# Patient Record
Sex: MALE | Race: OTHER | HISPANIC OR LATINO | Employment: FULL TIME | URBAN - METROPOLITAN AREA
[De-identification: names, ages, dates, MRNs, and addresses within clinical notes are randomized per-mention and may not be internally consistent; named-entity substitution may affect disease eponyms.]

---

## 2017-08-18 ENCOUNTER — HOSPITAL ENCOUNTER (EMERGENCY)
Facility: HOSPITAL | Age: 27
Discharge: HOME | End: 2017-08-18
Attending: EMERGENCY MEDICINE | Admitting: EMERGENCY MEDICINE

## 2017-08-18 VITALS
OXYGEN SATURATION: 98 % | HEIGHT: 66 IN | WEIGHT: 203 LBS | RESPIRATION RATE: 14 BRPM | TEMPERATURE: 98.6 F | HEART RATE: 71 BPM | DIASTOLIC BLOOD PRESSURE: 75 MMHG | BODY MASS INDEX: 32.62 KG/M2 | SYSTOLIC BLOOD PRESSURE: 132 MMHG

## 2017-08-18 DIAGNOSIS — K59.00 CONSTIPATION: Primary | ICD-10-CM

## 2017-08-18 PROCEDURE — 99283 EMERGENCY DEPT VISIT LOW MDM: CPT

## 2017-08-18 RX ORDER — DOCUSATE SODIUM 100 MG/1
100 CAPSULE, LIQUID FILLED ORAL 2 TIMES DAILY
Qty: 28 CAPSULE | Refills: 0 | Status: SHIPPED | OUTPATIENT
Start: 2017-08-18 | End: 2018-09-28

## 2017-08-18 RX ORDER — POLYETHYLENE GLYCOL 3350 17 G/17G
17 POWDER, FOR SOLUTION ORAL DAILY PRN
Qty: 14 EACH | Refills: 0 | Status: SHIPPED | OUTPATIENT
Start: 2017-08-18 | End: 2018-09-28

## 2018-09-28 ENCOUNTER — HOSPITAL ENCOUNTER (EMERGENCY)
Facility: HOSPITAL | Age: 28
Discharge: HOME | End: 2018-09-28
Attending: EMERGENCY MEDICINE | Admitting: EMERGENCY MEDICINE

## 2018-09-28 VITALS
RESPIRATION RATE: 18 BRPM | BODY MASS INDEX: 29.05 KG/M2 | TEMPERATURE: 97.7 F | WEIGHT: 180 LBS | HEART RATE: 81 BPM | DIASTOLIC BLOOD PRESSURE: 94 MMHG | SYSTOLIC BLOOD PRESSURE: 151 MMHG | OXYGEN SATURATION: 97 %

## 2018-09-28 DIAGNOSIS — K62.5 RECTAL BLEEDING: ICD-10-CM

## 2018-09-28 DIAGNOSIS — K62.4 ANAL STENOSIS: ICD-10-CM

## 2018-09-28 DIAGNOSIS — K59.00 CONSTIPATION: ICD-10-CM

## 2018-09-28 DIAGNOSIS — K62.89 ANAL OR RECTAL PAIN: Primary | ICD-10-CM

## 2018-09-28 PROCEDURE — 82272 OCCULT BLD FECES 1-3 TESTS: CPT

## 2018-09-28 PROCEDURE — 99283 EMERGENCY DEPT VISIT LOW MDM: CPT

## 2018-09-28 RX ORDER — DIAPER,BRIEF,INFANT-TODD,DISP
EACH MISCELLANEOUS
Qty: 15 G | Refills: 0 | Status: SHIPPED | OUTPATIENT
Start: 2018-09-28 | End: 2019-02-21

## 2018-09-29 NOTE — DISCHARGE INSTRUCTIONS
Constipation   WHAT YOU NEED TO KNOW:   Constipation is when you have hard, dry bowel movements, or you go longer than usual between bowel movements  DISCHARGE INSTRUCTIONS:   Return to the emergency department if:   · You have blood in your bowel movements  · You have a fever and abdominal pain with the constipation  Contact your healthcare provider if:   · Your constipation gets worse  · You start to vomit  · You have questions or concerns about your condition or care  Medicines:   · Medicine or a fiber supplement  may help make your bowel movement softer  A laxative may help relax and loosen your intestines to help you have a bowel movement  You may also be given medicine to increase fluid in your intestines  The fluid may help move bowel movements through your intestines  · Take your medicine as directed  Contact your healthcare provider if you think your medicine is not helping or if you have side effects  Tell him of her if you are allergic to any medicine  Keep a list of the medicines, vitamins, and herbs you take  Include the amounts, and when and why you take them  Bring the list or the pill bottles to follow-up visits  Carry your medicine list with you in case of an emergency  Manage your constipation:   · Drink liquids as directed  You may need to drink extra liquids to help soften and move your bowels  Ask how much liquid to drink each day and which liquids are best for you  · Eat high-fiber foods  This may help decrease constipation by adding bulk to your bowel movements  High-fiber foods include fruit, vegetables, whole-grain breads and cereals, and beans  Your healthcare provider or dietitian can help you create a high-fiber meal plan  · Exercise regularly  Regular physical activity can help stimulate your intestines  Ask which exercises are best for you  · Schedule a time each day to have a bowel movement    This may help train your body to have regular bowel movements  Bend forward while you are on the toilet to help move the bowel movement out  Sit on the toilet for at least 10 minutes, even if you do not have a bowel movement  Follow up with your healthcare provider as directed:  Write down your questions so you remember to ask them during your visits  © 2017 [employer] Information is for End User's use only and may not be sold, redistributed or otherwise used for commercial purposes  All illustrations and images included in CareNotes® are the copyrighted property of A D A M , [employer]  or Dhiraj Lea  The above information is an  only  It is not intended as medical advice for individual conditions or treatments  Talk to your doctor, nurse or pharmacist before following any medical regimen to see if it is safe and effective for you  Rectal Pain   WHAT YOU NEED TO KNOW:   Rectal pain can be caused by a number of conditions, such as hemorrhoids, an abscess, trauma, or anal tear  Infection, muscle spasms, or anal intercourse can also cause rectal pain  DISCHARGE INSTRUCTIONS:   Medicines: You may need any of the following:  · NSAIDs , such as ibuprofen, help decrease swelling, pain, and fever  This medicine is available with or without a doctor's order  NSAIDs can cause stomach bleeding or kidney problems in certain people  If you take blood thinner medicine, always ask your healthcare provider if NSAIDs are safe for you  Always read the medicine label and follow directions  · Prescription pain medicine  may be given  Ask how to take this medicine safely  · Antibiotics  help treat or prevent a bacterial infection  · Bowel movement softeners  help soften your bowel movement  They help prevent straining and more damage to the area  · Take your medicine as directed  Contact your healthcare provider if you think your medicine is not helping or if you have side effects   Tell him or her if you are allergic to any medicine  Keep a list of the medicines, vitamins, and herbs you take  Include the amounts, and when and why you take them  Bring the list or the pill bottles to follow-up visits  Carry your medicine list with you in case of an emergency  Return to the emergency department if:   · You have severe pain  Contact your healthcare provider if:   · Your pain does not decrease after 1 to 2 days of treatment  · You cannot take the medicine prescribed for your condition  · You have questions or concerns about your condition or care  Take a sitz bath:  Fill a bathtub with 4 to 6 inches of warm water  You may also use a sitz bath pan that fits over a toilet  Sit in the sitz bath for 20 minutes  Do this 2 to 3 times a day, or as directed  The warm water can help decrease pain, muscle spasms, or swelling  Apply heat:  Apply a warm, moist compress on your anus for 20 to 30 minutes every 2 hours for as many days as directed  Heat helps decrease pain and muscle spasms  Eat high-fiber foods: This will help prevent constipation and soften your bowel movements  High-fiber foods include fruit, vegetables, whole-grain breads and cereals, and beans  A dietitian or healthcare provider can help you create a high-fiber meal plan  Drink liquids as directed: You may need to drink more liquid than usual to help soften your bowel movements  Ask how much liquid to drink each day and which liquids are best for you  Follow up with your healthcare provider as directed:  Write down your questions so you remember to ask them during your visits  © 2017 [employer]  Information is for End User's use only and may not be sold, redistributed or otherwise used for commercial purposes  All illustrations and images included in CareNotes® are the copyrighted property of [employer]  or Dhiraj Lea  The above information is an  only   It is not intended as medical advice for individual conditions or treatments  Talk to your doctor, nurse or pharmacist before following any medical regimen to see if it is safe and effective for you        Bowel Regiment  MiraLAX: 3 times a day for 3 days, then once daily  Colace: 1 pill twice a day  Citrucel: As directed on the bottle  You should drink at least 1 5 L of water per day    If you have been taking narcotic pain medication you should add:  Senokot: 2 tablets at bedtime

## 2018-09-29 NOTE — ED PROVIDER NOTES
History  Chief Complaint   Patient presents with    Rectal Pain     C/O rectal pain and burning x3 days states noted bright red blood in stool yesterday and today     17-year-old male presents for evaluation of constipation with associated rectal pain  The patient states that he has had constipation for several years and over the past couple of weeks has had worsening problems with constipation and associated rectal pain with defecation  Patient has been trying Colace and senna without improvement  He states that he has to strain to move his bowels and over the past couple days has had blood in the bowel movements and on the toilet paper  Patient denies any associated abdominal pain  The patient states that he used to take Percocet several years ago but stopped them due to constipation  He has also been taking nonprescribed phentermine and drinking copious amounts of alcohol  The patient denies any rectal foreign bodies or rectal intercourse            None       History reviewed  No pertinent past medical history  Past Surgical History:   Procedure Laterality Date    FRACTURE SURGERY         History reviewed  No pertinent family history  I have reviewed and agree with the history as documented  Social History   Substance Use Topics    Smoking status: Current Every Day Smoker     Packs/day: 0 25     Types: Cigarettes    Smokeless tobacco: Never Used    Alcohol use Yes        Review of Systems   Constitutional: Negative for chills and fever  Gastrointestinal: Positive for anal bleeding, blood in stool, constipation and rectal pain  Negative for diarrhea, nausea and vomiting  All other systems reviewed and are negative  Physical Exam  Physical Exam   Constitutional: He is oriented to person, place, and time  He appears well-developed and well-nourished  No distress  HENT:   Head: Normocephalic and atraumatic     Right Ear: External ear normal    Left Ear: External ear normal  Mouth/Throat: No oropharyngeal exudate  Eyes: Pupils are equal, round, and reactive to light  EOM are normal  No scleral icterus  Neck: Normal range of motion  Neck supple  Cardiovascular: Normal rate, regular rhythm and normal heart sounds  Pulmonary/Chest: Effort normal and breath sounds normal  No respiratory distress  Abdominal: Soft  Bowel sounds are normal  There is no tenderness  There is no rebound and no guarding  Genitourinary: Rectal exam shows fissure, tenderness and guaiac positive stool  Rectal exam shows no external hemorrhoid and no mass  Musculoskeletal: Normal range of motion  Neurological: He is alert and oriented to person, place, and time  Skin: Skin is warm and dry  No rash noted  Psychiatric: He has a normal mood and affect  Nursing note and vitals reviewed  Vital Signs  ED Triage Vitals [09/28/18 1847]   Temperature Pulse Respirations Blood Pressure SpO2   97 7 °F (36 5 °C) 81 18 151/94 97 %      Temp Source Heart Rate Source Patient Position - Orthostatic VS BP Location FiO2 (%)   Oral Monitor Sitting Right arm --      Pain Score       8           Vitals:    09/28/18 1847   BP: 151/94   Pulse: 81   Patient Position - Orthostatic VS: Sitting       Visual Acuity      ED Medications  Medications - No data to display    Diagnostic Studies  Results Reviewed     None                 No orders to display              Procedures  Procedures       Phone Contacts  ED Phone Contact    ED Course                               MDM  Number of Diagnoses or Management Options  Anal or rectal pain: new and requires workup  Anal stenosis: new and requires workup  Constipation: new and requires workup  Rectal bleeding: new and requires workup  Diagnosis management comments: I discussed the patient's pain on exam of the anus with bleeding  There are no signs of infection  The patient does not report a history of fevers chills or other systemic infection    Patient states the symptoms have been ongoing for quite some time  I discussed the absolute need to follow up with GI for further evaluation of possible anal stenosis, anal mass or other serious medical conditions such as anal carcinoma  The patient verbalized understanding of the need to follow up with GI  He also verbalized understanding of warnings that would necessitate return to the emergency department  We also discussed a bowel regimen to soften the stool to help facilitate passage of stool       Amount and/or Complexity of Data Reviewed  Review and summarize past medical records: yes      CritCare Time    Disposition  Final diagnoses:   Anal or rectal pain   Constipation   Rectal bleeding   Anal stenosis - possible     Time reflects when diagnosis was documented in both MDM as applicable and the Disposition within this note     Time User Action Codes Description Comment    9/28/2018  8:06 PM Dianah Pellant Add [K62 89] Anal or rectal pain     9/28/2018  8:07 PM Dianah Pellant Add [K59 00] Constipation     9/28/2018  8:07 PM Dianah Pellant Add [K62 5] Rectal bleeding     9/28/2018  8:07 PM Reather Economy B Add [K62 4] Anal stenosis     9/28/2018  8:07 PM Dianah Pellant Modify [K62 4] Anal stenosis possible      ED Disposition     ED Disposition Condition Comment    Discharge  Mer Moraes discharge to home/self care      Condition at discharge: Stable        Follow-up Information     Follow up With Specialties Details Why [facility] Schedule an appointment as soon as possible for a visit For further evaluation [address] [place] [address]  [id number]  [address] [facility] Schedule an appointment as soon as possible for a visit For further evaluation of anal pain and bleeding [facility] [id number] [phone]          Patient's Medications Discharge Prescriptions    No medications on file     No discharge procedures on file      ED Provider  Electronically Signed by           Brian Sierra DO  09/28/18 6470

## 2019-02-21 ENCOUNTER — HOSPITAL ENCOUNTER (EMERGENCY)
Facility: HOSPITAL | Age: 29
Discharge: HOME | End: 2019-02-21
Attending: EMERGENCY MEDICINE | Admitting: EMERGENCY MEDICINE

## 2019-02-21 VITALS
HEIGHT: 66 IN | HEART RATE: 62 BPM | TEMPERATURE: 96.5 F | RESPIRATION RATE: 16 BRPM | DIASTOLIC BLOOD PRESSURE: 84 MMHG | WEIGHT: 180 LBS | OXYGEN SATURATION: 99 % | SYSTOLIC BLOOD PRESSURE: 144 MMHG | BODY MASS INDEX: 28.93 KG/M2

## 2019-02-21 DIAGNOSIS — K04.7 DENTAL ABSCESS: Primary | ICD-10-CM

## 2019-02-21 DIAGNOSIS — K08.89 PAIN, DENTAL: ICD-10-CM

## 2019-02-21 PROCEDURE — 99283 EMERGENCY DEPT VISIT LOW MDM: CPT

## 2019-02-21 PROCEDURE — 96365 THER/PROPH/DIAG IV INF INIT: CPT

## 2019-02-21 RX ORDER — CLINDAMYCIN HYDROCHLORIDE 150 MG/1
300 CAPSULE ORAL EVERY 6 HOURS
Qty: 56 CAPSULE | Refills: 0 | Status: SHIPPED | OUTPATIENT
Start: 2019-02-21 | End: 2019-02-28

## 2019-02-21 RX ORDER — CLINDAMYCIN PHOSPHATE 600 MG/50ML
600 INJECTION INTRAVENOUS ONCE
Status: COMPLETED | OUTPATIENT
Start: 2019-02-21 | End: 2019-02-21

## 2019-02-21 RX ADMIN — CLINDAMYCIN IN 5 PERCENT DEXTROSE 600 MG: 12 INJECTION, SOLUTION INTRAVENOUS at 11:24

## 2019-02-21 NOTE — ED PROVIDER NOTES
History  Chief Complaint   Patient presents with    Dental Problem     pt c/o dental pain (abscess) to right upper jaw, x2 days  pt denies taking anything for pain  pt denies having dentist  pt denies fevers  25-year-old male with presenting for evaluation toothache and facial swelling  Patient reports that he has had that right-sided toothache that comes and goes for months now  He reports that 3 days ago he started noticing increasing constant pain in the right upper tooth  He reports that during this 3 days he has had increasing swelling of the right cheek as well  He denies taking anything for pain at home  He states he had left over antibiotics 'some green pills' that he took yesterday without any relief of symptoms  He states that it has been years since last time he went to the dentist   He denies any fevers chills or sweats  None       History reviewed  No pertinent past medical history  Past Surgical History:   Procedure Laterality Date    FRACTURE SURGERY         History reviewed  No pertinent family history  I have reviewed and agree with the history as documented  Social History     Tobacco Use    Smoking status: Current Every Day Smoker     Packs/day: 0 50     Types: Cigarettes    Smokeless tobacco: Never Used   Substance Use Topics    Alcohol use: Yes     Comment: socially    Drug use: Yes     Types: Marijuana        Review of Systems   All other systems reviewed and are negative  Physical Exam  Physical Exam   Constitutional: He is oriented to person, place, and time  He appears well-developed and well-nourished  No distress  HENT:   Head: Normocephalic and atraumatic  Right Ear: External ear normal    Left Ear: External ear normal    Mouth/Throat:       No Placido's angina, appears abdomen dental hygiene no visible dental caries or fractured teeth   Eyes: Conjunctivae are normal    EOM grossly intact   Neck: Normal range of motion  Neck supple   No JVD present  Cardiovascular: Normal rate  Pulmonary/Chest: Effort normal    Abdominal: Soft  Musculoskeletal:   FROM, steady gait, cap refill brisk, strength and sensation grossly intact throughout   Neurological: He is alert and oriented to person, place, and time  Skin: Skin is warm and dry  Capillary refill takes less than 2 seconds  Psychiatric: He has a normal mood and affect  His behavior is normal    Nursing note and vitals reviewed  Vital Signs  ED Triage Vitals [02/21/19 1100]   Temperature Pulse Respirations Blood Pressure SpO2   (!) 96 5 °F (35 8 °C) 62 16 144/84 99 %      Temp Source Heart Rate Source Patient Position - Orthostatic VS BP Location FiO2 (%)   Tympanic Monitor Sitting Left arm --      Pain Score       8           Vitals:    02/21/19 1100   BP: 144/84   Pulse: 62   Patient Position - Orthostatic VS: Sitting       Visual Acuity      ED Medications  Medications   clindamycin (CLEOCIN) IVPB (premix) 600 mg (0 mg Intravenous Stopped 2/21/19 1200)       Diagnostic Studies  Results Reviewed     None                 No orders to display              Procedures  Procedures       Phone Contacts  ED Phone Contact    ED Course                               MDM  Number of Diagnoses or Management Options  Dental abscess:   Pain, dental:   Diagnosis management comments: 39-year-old male presenting for evaluation of toothache and R sided facial swelling, pt given 1 dose of IV clinda here given the swelling on physical exam, otherwise no abscess visualized intraorally to complete I&D at this time, will give clinda PO, advised to take motrin and tylenol for inflammation and pain, otherwise pt is afebrile, airway patent, no signs of ludwigs angina, non toxic appearing no acute distress, provided information to f/u with dental clinic and pcp     strict return to ED precautions discussed  Pt verbalizes understanding and agrees with plan   Pt is stable for discharge    Portions of the record may have been created with voice recognition software  Occasional wrong word or "sound a like" substitutions may have occurred due to the inherent limitations of voice recognition software  Read the chart carefully and recognize, using context, where substitutions have occurred  Disposition  Final diagnoses:   Dental abscess   Pain, dental     Time reflects when diagnosis was documented in both MDM as applicable and the Disposition within this note     Time User Action Codes Description Comment    2/21/2019 11:56 AM Davion CR Add [K04 7] Dental abscess     2/21/2019 11:56 AM Davion CR Add [K08 89] Pain, dental       ED Disposition     ED Disposition Condition Date/Time Comment    Discharge Stable Thu Feb 21, 2019 11:56 AM Sallie Cope discharge to home/self care  Follow-up Information     Follow up With Specialties Details Why [address] [facility] Schedule an appointment as soon as possible for a visit  As needed [facility]      [facility]  Call today  [address] [facility] [address] [facility]          Discharge Medication List as of 2/21/2019 11:57 AM      START taking these medications    Details   clindamycin (CLEOCIN) 150 mg capsule Take 2 capsules (300 mg total) by mouth every 6 (six) hours for 7 days, Starting Thu 2/21/2019, Until Thu 2/28/2019, Print         CONTINUE these medications which have NOT CHANGED    Details   hydrocortisone 1 % cream Apply to affected area 2 times daily, Print      Lidocaine, Anorectal, 5 % GEL Apply 1 application topically every 6 (six) hours as needed (rectal pain), Starting Fri 9/28/2018, Print      witch hazel-glycerin (TUCKS) topical pad Insert 1 pad into the rectum as needed for irritation, Starting Fri 9/28/2018, Print           No discharge procedures on file      ED Provider  Electronically Signed by           Marisol Ibanez Rebecca Hart PA-C  02/21/19 8410

## 2020-02-05 ENCOUNTER — HOSPITAL ENCOUNTER (EMERGENCY)
Facility: HOSPITAL | Age: 30
Discharge: HOME | End: 2020-02-05
Attending: EMERGENCY MEDICINE | Admitting: EMERGENCY MEDICINE

## 2020-02-05 VITALS
BODY MASS INDEX: 29.39 KG/M2 | SYSTOLIC BLOOD PRESSURE: 168 MMHG | RESPIRATION RATE: 14 BRPM | HEART RATE: 89 BPM | OXYGEN SATURATION: 98 % | DIASTOLIC BLOOD PRESSURE: 93 MMHG | WEIGHT: 182.1 LBS | TEMPERATURE: 98.2 F

## 2020-02-05 DIAGNOSIS — R14.0 BLOATING: ICD-10-CM

## 2020-02-05 DIAGNOSIS — K59.00 CONSTIPATION: ICD-10-CM

## 2020-02-05 DIAGNOSIS — R10.9 ABDOMINAL PAIN: Primary | ICD-10-CM

## 2020-02-05 PROCEDURE — 99282 EMERGENCY DEPT VISIT SF MDM: CPT | Performed by: EMERGENCY MEDICINE

## 2020-02-05 PROCEDURE — 99283 EMERGENCY DEPT VISIT LOW MDM: CPT

## 2020-02-05 RX ORDER — HYDROCORTISONE ACETATE 25 MG/1
25 SUPPOSITORY RECTAL 2 TIMES DAILY PRN
Qty: 12 SUPPOSITORY | Refills: 0 | Status: SHIPPED | OUTPATIENT
Start: 2020-02-05

## 2020-02-05 RX ORDER — SENNOSIDES 8.6 MG
2 TABLET ORAL
Qty: 120 EACH | Refills: 0 | Status: SHIPPED | OUTPATIENT
Start: 2020-02-05

## 2020-02-05 RX ORDER — BISACODYL 10 MG
10 SUPPOSITORY, RECTAL RECTAL DAILY PRN
Qty: 12 SUPPOSITORY | Refills: 0 | Status: SHIPPED | OUTPATIENT
Start: 2020-02-05

## 2020-02-05 NOTE — DISCHARGE INSTRUCTIONS
Increase the fiber in your diet  Use a fiber supplement such as metamucil daily as directed on the packaging  Switch to high fiber bread, brown rice, whole grain pastas and cereals  Increase your intake of fresh fruit and vegetables  You can also use over the counter enemas (such as Fleets) as needed

## 2020-02-05 NOTE — ED PROVIDER NOTES
History  Chief Complaint   Patient presents with    Constipation     constipated for two days ongoing problem pt c/o feeling bloated and having abd pain  no fevers no vomiting  Here for recurrent episodes of constipation  Last BM 2 days ago - reports 3 bms that day, but none since  Stools ranges from soft to 'hard balls'  Previous took colace but stopped b/c he didn't want to become dependent upon it  Started taking miralax but feels bloated and constipated  +flatus    No previous surgeries, no hx of obstruction  Normal appetite, no n/v  No changes in urination    Admits to relatively poor diet  Doesn't take any fiber supplements  Recently started a probiotic and eating a lot of broccoli, but doesn't seem to have helped  Long d/w pt regarding slow changes and that to change his bowel habits will take time  History provided by:  Patient   used: No    Constipation   Severity:  Moderate  Time since last bowel movement:  2 days  Timing:  Intermittent  Progression:  Waxing and waning  Chronicity:  Recurrent  Context: not dehydration and not stress    Stool description:  None produced  Relieved by:  None tried  Worsened by:  Nothing  Ineffective treatments:  Diet changes and Miralax  Associated symptoms: flatus    Associated symptoms: no abdominal pain, no anorexia, no dysuria, no fever, no nausea and no vomiting    Risk factors: no recent antibiotic use and no recent surgery        None       History reviewed  No pertinent past medical history  Past Surgical History:   Procedure Laterality Date    FRACTURE SURGERY         History reviewed  No pertinent family history  I have reviewed and agree with the history as documented      Social History     Tobacco Use    Smoking status: Current Every Day Smoker     Packs/day: 0 50     Types: Cigarettes    Smokeless tobacco: Never Used   Substance Use Topics    Alcohol use: Yes     Comment: socially    Drug use: Yes     Types: Marijuana        Review of Systems   Constitutional: Negative for fever  Gastrointestinal: Positive for constipation and flatus  Negative for abdominal pain, anorexia, nausea and vomiting  Genitourinary: Negative for dysuria  All other systems reviewed and are negative  Physical Exam  Physical Exam   Constitutional: He appears well-developed and well-nourished  HENT:   Nose: Nose normal    Mouth/Throat: Oropharynx is clear and moist    Eyes: Conjunctivae are normal    Neck: Neck supple  Cardiovascular: Normal rate and regular rhythm  Pulmonary/Chest: Effort normal and breath sounds normal    Abdominal: Soft  Normal appearance and bowel sounds are normal  There is no tenderness  There is no rigidity, no rebound and no guarding  Musculoskeletal: He exhibits no deformity  Neurological: He is alert  Skin: Skin is warm  Psychiatric: He has a normal mood and affect  Nursing note and vitals reviewed  Vital Signs  ED Triage Vitals [02/05/20 1443]   Temperature Pulse Respirations Blood Pressure SpO2   98 2 °F (36 8 °C) 89 14 168/93 98 %      Temp Source Heart Rate Source Patient Position - Orthostatic VS BP Location FiO2 (%)   Temporal Monitor Sitting Right arm --      Pain Score       No Pain           Vitals:    02/05/20 1443   BP: 168/93   Pulse: 89   Patient Position - Orthostatic VS: Sitting         Visual Acuity      ED Medications  Medications - No data to display    Diagnostic Studies  Results Reviewed     None                 No orders to display              Procedures  Procedures         ED Course                               MDM  Number of Diagnoses or Management Options  Abdominal pain: new and does not require workup  Bloating: new and does not require workup  Constipation: new and does not require workup  Diagnosis management comments: Benign abd exam, no real outpt tx, no risk factors for obstruction  Will treat w/ outpt meds   D/w at length dietary changes        Disposition  Final diagnoses:   Constipation   Bloating   Abdominal pain     Time reflects when diagnosis was documented in both MDM as applicable and the Disposition within this note     Time User Action Codes Description Comment    2/5/2020  3:29 PM Siena Monet Add [K59 00] Constipation     2/5/2020  3:29 PM Alie Stacks [R14 0] Bloating     2/5/2020  3:29 PM Tierney, [address] [R10 9] Abdominal pain     2/5/2020  3:29 PM Siena Monet Modify [K59 00] Constipation     2/5/2020  3:29 PM Darren AGARWAL Modify [R10 9] Abdominal pain       ED Disposition     ED Disposition Condition Date/Time Comment    Discharge Stable Wed Feb 5, 2020  3:29 PM       Follow-up Information     Follow up With Specialties Details Why Contact Info Additional [facility] Schedule an appointment as soon as possible for a visit   [address]  [address]  [facility] [address]  Clemencia Mckeon [facility], [address], [address], [place], [place], [address] [phone]          Discharge Medication List as of 2/5/2020  3:35 PM      START taking these medications    Details   bisacodyl (DULCOLAX) 10 mg suppository Insert 1 suppository (10 mg total) into the rectum daily as needed for constipation, Starting Wed 2/5/2020, Print      hydrocortisone (ANUSOL-HC) 25 mg suppository Insert 1 suppository (25 mg total) into the rectum 2 (two) times a day as needed for hemorrhoids, Starting Wed 2/5/2020, Print      magnesium citrate solution Take 296 mL by mouth once for 1 dose, Starting Wed 2/5/2020, Print      senna (SENOKOT) 8 6 mg Take 2 tablets (17 2 mg total) by mouth daily at bedtime as needed for constipation, Starting Wed 2/5/2020, Print           No discharge procedures on file      ED Provider  Electronically Signed by           Fadia Cisse DO  02/05/20 2028

## 2023-08-15 ENCOUNTER — HOSPITAL ENCOUNTER (EMERGENCY)
Facility: HOSPITAL | Age: 33
Discharge: HOME | End: 2023-08-15
Attending: EMERGENCY MEDICINE
Payer: COMMERCIAL

## 2023-08-15 VITALS
TEMPERATURE: 98 F | SYSTOLIC BLOOD PRESSURE: 175 MMHG | RESPIRATION RATE: 19 BRPM | OXYGEN SATURATION: 97 % | BODY MASS INDEX: 35.51 KG/M2 | WEIGHT: 220.02 LBS | HEART RATE: 81 BPM | DIASTOLIC BLOOD PRESSURE: 82 MMHG

## 2023-08-15 DIAGNOSIS — J06.9 VIRAL UPPER RESPIRATORY TRACT INFECTION: Primary | ICD-10-CM

## 2023-08-15 PROCEDURE — NC001 PR NO CHARGE

## 2023-08-15 PROCEDURE — 99283 EMERGENCY DEPT VISIT LOW MDM: CPT

## 2023-08-15 NOTE — DISCHARGE INSTRUCTIONS
Your symptoms are most likely caused by a virus and should continue improving over the next week. Rest, drink plenty of water, and use over-the-counter medications for relief such as ibuprofen, tylenol, zyrtec, flonase nasal spray, and mucinex. We will call you with any positive results and you can view negative results on your mychart.

## 2023-08-15 NOTE — Clinical Note
Josesito Ward was seen and treated in our emergency department on 8/15/2023. No restrictions            Diagnosis: Upper respiratory infection. Aspen Reagan  . He may return on this date: 08/17/2023         If you have any questions or concerns, please don't hesitate to call.       Garrett Lucas PA-C    ______________________________           _______________          _______________  Hospital Representative                              Date                                Time

## 2023-08-15 NOTE — ED PROVIDER NOTES
History  Chief Complaint   Patient presents with   • Nasal Drainage     Runny nose, headache, sneezing, chills     57-year-old male presents for evaluation of myalgias, headache, rhinorrhea, nasal congestion, cough, sneezing, chills. States he was traveling through [place] and [place] recently. States his child and his partner were both recently sick with similar symptoms. Took some kind of OTC medication PTA with mild relief. Prior to Admission Medications   Prescriptions Last Dose Informant Patient Reported? Taking?   bisacodyl (DULCOLAX) 10 mg suppository   No No   Sig: Insert 1 suppository (10 mg total) into the rectum daily as needed for constipation   hydrocortisone (ANUSOL-HC) 25 mg suppository   No No   Sig: Insert 1 suppository (25 mg total) into the rectum 2 (two) times a day as needed for hemorrhoids   magnesium citrate solution   No No   Sig: Take 296 mL by mouth once for 1 dose   senna (SENOKOT) 8.6 mg   No No   Sig: Take 2 tablets (17.2 mg total) by mouth daily at bedtime as needed for constipation      Facility-Administered Medications: None       No past medical history on file. Past Surgical History:   Procedure Laterality Date   • FRACTURE SURGERY         No family history on file. I have reviewed and agree with the history as documented. E-Cigarette/Vaping     E-Cigarette/Vaping Substances     Social History     Tobacco Use   • Smoking status: Every Day     Packs/day: 0.50     Types: Cigarettes   • Smokeless tobacco: Never   Substance Use Topics   • Alcohol use: Yes     Comment: socially   • Drug use: Yes     Types: Marijuana       Review of Systems   Constitutional: Positive for chills and fatigue. Negative for fever. HENT: Positive for congestion, rhinorrhea, sinus pressure and sneezing. Negative for ear pain and sore throat. Eyes: Negative for pain and visual disturbance. Respiratory: Positive for cough. Negative for shortness of breath.     Cardiovascular: Negative for chest pain and palpitations. Gastrointestinal: Negative for abdominal pain and vomiting. Genitourinary: Negative for dysuria and hematuria. Musculoskeletal: Positive for myalgias. Negative for arthralgias and back pain. Skin: Negative for color change and rash. Neurological: Positive for headaches. Negative for seizures and syncope. All other systems reviewed and are negative. Physical Exam  Physical Exam  Vitals and nursing note reviewed. Constitutional:       General: He is not in acute distress. Appearance: He is well-developed. He is ill-appearing. He is not toxic-appearing. HENT:      Head: Normocephalic and atraumatic. Nose: Congestion and rhinorrhea present. Eyes:      Conjunctiva/sclera: Conjunctivae normal.   Cardiovascular:      Rate and Rhythm: Normal rate and regular rhythm. Heart sounds: No murmur heard. Pulmonary:      Effort: Pulmonary effort is normal. No respiratory distress. Breath sounds: Normal breath sounds. Abdominal:      Palpations: Abdomen is soft. Tenderness: There is no abdominal tenderness. Musculoskeletal:         General: No swelling. Cervical back: Neck supple. Skin:     General: Skin is warm and dry. Capillary Refill: Capillary refill takes less than 2 seconds. Neurological:      Mental Status: He is alert.    Psychiatric:         Mood and Affect: Mood normal.         Vital Signs  ED Triage Vitals [08/15/23 0036]   Temperature Pulse Respirations Blood Pressure SpO2   98 °F (36.7 °C) 81 19 (!) 175/82 97 %      Temp src Heart Rate Source Patient Position - Orthostatic VS BP Location FiO2 (%)   -- Monitor Sitting Right arm --      Pain Score       --           Vitals:    08/15/23 0036   BP: (!) 175/82   Pulse: 81   Patient Position - Orthostatic VS: Sitting         Visual Acuity      ED Medications  Medications - No data to display    Diagnostic Studies  Results Reviewed     Procedure Component Value Units Date/Time    FLU/RSV/COVID - if FLU/RSV clinically relevant [[id number]] Collected: 08/15/23 0127    Lab Status: No result Specimen: Nares from Nose                  No orders to display              Procedures  Procedures         ED Course                                             Medical Decision Making  64-year-old male presents for evaluation of various URI symptoms. Exam: Patient appears fatigued, congested; exam otherwise unremarkable, lungs CTA B, normal respiratory effort, heart RRR. Symptoms most likely consistent with viral URI. We will test for COVID/flu/RSV. Thoroughly discuss supportive care including ibuprofen, Tylenol, Mucinex, Flonase nasal spray, Zyrtec, rest, hydration. Educated patient on likely etiology of his condition, recommended follow-up with PCP if condition not improving. Disposition  Final diagnoses:   Viral upper respiratory tract infection     Time reflects when diagnosis was documented in both MDM as applicable and the Disposition within this note     Time User Action Codes Description Comment    8/15/2023  1:25 AM Jennie Weston [J06.9] Viral upper respiratory tract infection       ED Disposition     ED Disposition   Discharge    Condition   Stable    Date/Time   Tue Aug 15, 2023  1:25 AM    Comment   Daren Rosales discharge to home/self care.                Follow-up Information    None         Discharge Medication List as of 8/15/2023  1:29 AM      CONTINUE these medications which have NOT CHANGED    Details   bisacodyl (DULCOLAX) 10 mg suppository Insert 1 suppository (10 mg total) into the rectum daily as needed for constipation, Starting Wed 2/5/2020, Print      hydrocortisone (ANUSOL-HC) 25 mg suppository Insert 1 suppository (25 mg total) into the rectum 2 (two) times a day as needed for hemorrhoids, Starting Wed 2/5/2020, Print      magnesium citrate solution Take 296 mL by mouth once for 1 dose, Starting Wed 2/5/2020, Print      senna (SENOKOT) 8.6 mg Take 2 tablets (17.2 mg total) by mouth daily at bedtime as needed for constipation, Starting Wed 2/5/2020, Print             No discharge procedures on file.     PDMP Review     None          ED Provider  Electronically Signed by           Jose Guadalupe Mariscal PA-C  08/15/23 5712